# Patient Record
Sex: FEMALE
[De-identification: names, ages, dates, MRNs, and addresses within clinical notes are randomized per-mention and may not be internally consistent; named-entity substitution may affect disease eponyms.]

---

## 2020-11-09 ENCOUNTER — APPOINTMENT (OUTPATIENT)
Dept: RHEUMATOLOGY | Facility: CLINIC | Age: 40
End: 2020-11-09

## 2020-11-23 PROBLEM — Z00.00 ENCOUNTER FOR PREVENTIVE HEALTH EXAMINATION: Status: ACTIVE | Noted: 2020-11-23

## 2020-11-24 ENCOUNTER — APPOINTMENT (OUTPATIENT)
Dept: RHEUMATOLOGY | Facility: CLINIC | Age: 40
End: 2020-11-24
Payer: COMMERCIAL

## 2020-11-24 ENCOUNTER — LABORATORY RESULT (OUTPATIENT)
Age: 40
End: 2020-11-24

## 2020-11-24 VITALS
HEART RATE: 87 BPM | TEMPERATURE: 98.1 F | OXYGEN SATURATION: 100 % | DIASTOLIC BLOOD PRESSURE: 80 MMHG | SYSTOLIC BLOOD PRESSURE: 131 MMHG | HEIGHT: 61 IN

## 2020-11-24 DIAGNOSIS — K90.0 CELIAC DISEASE: ICD-10-CM

## 2020-11-24 DIAGNOSIS — Z82.61 FAMILY HISTORY OF ARTHRITIS: ICD-10-CM

## 2020-11-24 DIAGNOSIS — Z78.9 OTHER SPECIFIED HEALTH STATUS: ICD-10-CM

## 2020-11-24 DIAGNOSIS — Z82.49 FAMILY HISTORY OF ISCHEMIC HEART DISEASE AND OTHER DISEASES OF THE CIRCULATORY SYSTEM: ICD-10-CM

## 2020-11-24 DIAGNOSIS — Z80.42 FAMILY HISTORY OF MALIGNANT NEOPLASM OF PROSTATE: ICD-10-CM

## 2020-11-24 DIAGNOSIS — E03.9 HYPOTHYROIDISM, UNSPECIFIED: ICD-10-CM

## 2020-11-24 DIAGNOSIS — F41.9 ANXIETY DISORDER, UNSPECIFIED: ICD-10-CM

## 2020-11-24 PROCEDURE — 99243 OFF/OP CNSLTJ NEW/EST LOW 30: CPT

## 2020-11-24 RX ORDER — LEVOTHYROXINE SODIUM 50 UG/1
50 TABLET ORAL
Refills: 0 | Status: ACTIVE | COMMUNITY

## 2020-11-24 RX ORDER — SERTRALINE HYDROCHLORIDE 100 MG/1
100 TABLET, FILM COATED ORAL
Refills: 0 | Status: ACTIVE | COMMUNITY

## 2020-11-24 NOTE — HISTORY OF PRESENT ILLNESS
[FreeTextEntry1] : This is 41 y/o woman with hx of hypothyroidism, celiac, and anxiety who presents for evaluation of achy joints.  \par \par Month ago she started feeling achy joints.  It is usually in the AM, her hands and knees are very stiff, sometimes both elbow.s  It has never been that bad, but she did stay home from work for it one time. \par She has taken Aleve and tried heating pads.  \par She hasn't tried any other medications.  \par She has not seen any red or swollen joints.  Movement helps her.  \par She feels it every day.  \par \par She states her celiac is well controlled and she is strict about gluten free diet.

## 2020-11-24 NOTE — ASSESSMENT
[FreeTextEntry1] : This is 39 y/o woman with hx of hypothyroidism, celiac, and anxiety who presents for evaluation of achy joints.  Her history is suggestive of an inflammatory arthritis, such as RA, however I do not detect any synovitis on examination today.  We will investigate further with labs and imaging.  \par \par Plan:\par -Labs as ordered\par -XRs hands, knees\par -Declining any medication for joint pain today\par -f/u 2 weeks 12/8/20 at 3:15pm

## 2020-12-01 LAB
ALBUMIN SERPL ELPH-MCNC: 4.7 G/DL
ALP BLD-CCNC: 61 U/L
ALT SERPL-CCNC: 12 U/L
ANA PAT FLD IF-IMP: ABNORMAL
ANA SER IF-ACNC: ABNORMAL
ANION GAP SERPL CALC-SCNC: 11 MMOL/L
AST SERPL-CCNC: 18 U/L
BASOPHILS # BLD AUTO: 0.04 K/UL
BASOPHILS NFR BLD AUTO: 0.7 %
BILIRUB SERPL-MCNC: 0.2 MG/DL
BUN SERPL-MCNC: 13 MG/DL
C3 SERPL-MCNC: 108 MG/DL
C4 SERPL-MCNC: 18 MG/DL
CALCIUM SERPL-MCNC: 9.6 MG/DL
CCP AB SER IA-ACNC: <8 UNITS
CHLORIDE SERPL-SCNC: 108 MMOL/L
CO2 SERPL-SCNC: 22 MMOL/L
CREAT SERPL-MCNC: 0.87 MG/DL
CRP SERPL-MCNC: <0.1 MG/DL
DSDNA AB SER-ACNC: <12 IU/ML
ENA RNP AB SER IA-ACNC: 0.5 AL
ENA SM AB SER IA-ACNC: <0.2 AL
ENA SS-A AB SER IA-ACNC: <0.2 AL
ENA SS-B AB SER IA-ACNC: <0.2 AL
EOSINOPHIL # BLD AUTO: 0.04 K/UL
EOSINOPHIL NFR BLD AUTO: 0.7 %
ERYTHROCYTE [SEDIMENTATION RATE] IN BLOOD BY WESTERGREN METHOD: 21 MM/HR
GLUCOSE SERPL-MCNC: 102 MG/DL
HCT VFR BLD CALC: 28.6 %
HGB BLD-MCNC: 8.4 G/DL
HLA-B27 RELATED AG QL: NORMAL
IMM GRANULOCYTES NFR BLD AUTO: 0.2 %
LYMPHOCYTES # BLD AUTO: 1.41 K/UL
LYMPHOCYTES NFR BLD AUTO: 23.5 %
MAN DIFF?: NORMAL
MCHC RBC-ENTMCNC: 21.2 PG
MCHC RBC-ENTMCNC: 29.4 GM/DL
MCV RBC AUTO: 72 FL
MONOCYTES # BLD AUTO: 0.41 K/UL
MONOCYTES NFR BLD AUTO: 6.8 %
NEUTROPHILS # BLD AUTO: 4.09 K/UL
NEUTROPHILS NFR BLD AUTO: 68.1 %
PLATELET # BLD AUTO: 320 K/UL
POTASSIUM SERPL-SCNC: 4.3 MMOL/L
PROT SERPL-MCNC: 7.1 G/DL
RBC # BLD: 3.97 M/UL
RBC # FLD: 20.2 %
RF+CCP IGG SER-IMP: NEGATIVE
RHEUMATOID FACT SER QL: <10 IU/ML
SODIUM SERPL-SCNC: 140 MMOL/L
THYROGLOB AB SERPL-ACNC: 2891 IU/ML
THYROPEROXIDASE AB SERPL IA-ACNC: 1354 IU/ML
WBC # FLD AUTO: 6 K/UL

## 2020-12-08 ENCOUNTER — APPOINTMENT (OUTPATIENT)
Dept: RHEUMATOLOGY | Facility: CLINIC | Age: 40
End: 2020-12-08
Payer: COMMERCIAL

## 2020-12-08 ENCOUNTER — TRANSCRIPTION ENCOUNTER (OUTPATIENT)
Age: 40
End: 2020-12-08

## 2020-12-08 VITALS
HEART RATE: 93 BPM | OXYGEN SATURATION: 99 % | TEMPERATURE: 97.3 F | SYSTOLIC BLOOD PRESSURE: 100 MMHG | DIASTOLIC BLOOD PRESSURE: 70 MMHG | HEIGHT: 61 IN

## 2020-12-08 DIAGNOSIS — M25.50 PAIN IN UNSPECIFIED JOINT: ICD-10-CM

## 2020-12-08 DIAGNOSIS — R70.0 ELEVATED ERYTHROCYTE SEDIMENTATION RATE: ICD-10-CM

## 2020-12-08 DIAGNOSIS — R76.8 OTHER SPECIFIED ABNORMAL IMMUNOLOGICAL FINDINGS IN SERUM: ICD-10-CM

## 2020-12-08 PROCEDURE — 99214 OFFICE O/P EST MOD 30 MIN: CPT | Mod: 25

## 2020-12-08 PROCEDURE — 99072 ADDL SUPL MATRL&STAF TM PHE: CPT

## 2020-12-08 PROCEDURE — 36415 COLL VENOUS BLD VENIPUNCTURE: CPT

## 2020-12-08 RX ORDER — ZOLPIDEM TARTRATE 10 MG/1
10 TABLET ORAL
Qty: 30 | Refills: 0 | Status: DISCONTINUED | COMMUNITY
Start: 2020-08-10

## 2020-12-08 NOTE — ASSESSMENT
[FreeTextEntry1] : This is 39 y/o woman with hx of hypothyroidism, celiac, and anxiety who presents for evaluation of achy joints. Her history is suggestive of an inflammatory arthritis. Labs reveal a +BIRD 1:160 speckled, and an elevated ESR at 21.\par \par \par Plan:\par -Labs reviewed, as above\par -XRs hands, knees were unremarkable.  \par - Risks/benefits/adverse effects discussed, including but not limited to GI disturbances, headaches, and retinal toxicity.  Advised baseline eye exam and will perform G6PD screening.\par -Once G6PD obtained, can start /400mg\par f/u 8 weeks\par

## 2020-12-08 NOTE — HISTORY OF PRESENT ILLNESS
[FreeTextEntry1] : This is 39 y/o woman with hx of hypothyroidism, celiac, and anxiety who presents for evaluation of achy joints. \par \par Month ago she started feeling achy joints. It is usually in the AM, her hands and knees are very stiff, sometimes both elbow.s It has never been that bad, but she did stay home from work for it one time. \par She has taken Aleve and tried heating pads. \par She hasn't tried any other medications. \par She has not seen any red or swollen joints. Movement helps her. \par She feels it every day. \par \par She states her celiac is well controlled and she is strict about gluten free diet. \par

## 2020-12-11 LAB — G6PD SER-CCNC: 23.1 U/G HGB

## 2021-02-09 ENCOUNTER — APPOINTMENT (OUTPATIENT)
Dept: RHEUMATOLOGY | Facility: CLINIC | Age: 41
End: 2021-02-09

## 2023-03-16 ENCOUNTER — NON-APPOINTMENT (OUTPATIENT)
Age: 43
End: 2023-03-16

## 2023-06-27 ENCOUNTER — OFFICE (OUTPATIENT)
Dept: URBAN - METROPOLITAN AREA CLINIC 88 | Facility: CLINIC | Age: 43
Setting detail: OPHTHALMOLOGY
End: 2023-06-27
Payer: COMMERCIAL

## 2023-06-27 DIAGNOSIS — H16.223: ICD-10-CM

## 2023-06-27 DIAGNOSIS — H50.00: ICD-10-CM

## 2023-06-27 PROBLEM — H52.4 PRESBYOPIA: Status: ACTIVE | Noted: 2023-06-27

## 2023-06-27 PROCEDURE — 92014 COMPRE OPH EXAM EST PT 1/>: CPT | Performed by: OPTOMETRIST

## 2023-06-27 ASSESSMENT — TONOMETRY
OS_IOP_MMHG: 11
OD_IOP_MMHG: 18

## 2023-06-27 ASSESSMENT — REFRACTION_MANIFEST
OD_SPHERE: +0.25
OD_CYLINDER: -0.75
OD_VA1: 20/25
OS_CYLINDER: -1.00
OD_AXIS: 149
OS_VA1: 20/25+1
OS_AXIS: 046
OS_SPHERE: -0.25

## 2023-06-27 ASSESSMENT — REFRACTION_AUTOREFRACTION
OS_AXIS: 036
OD_AXIS: 146
OD_SPHERE: PLANO
OS_CYLINDER: -1.00
OD_CYLINDER: -0.75
OS_SPHERE: PLANO

## 2023-06-27 ASSESSMENT — VISUAL ACUITY
OD_BCVA: 20/20
OS_BCVA: 20/25-2

## 2023-06-27 ASSESSMENT — KERATOMETRY
OS_K1POWER_DIOPTERS: 40.25
OD_K1POWER_DIOPTERS: 39.75
OD_AXISANGLE_DEGREES: 052
OD_K2POWER_DIOPTERS: 41.00
OS_AXISANGLE_DEGREES: 132
OS_K2POWER_DIOPTERS: 41.75

## 2023-06-27 ASSESSMENT — AXIALLENGTH_DERIVED
OD_AL: 24.8524
OS_AL: 24.8678

## 2023-06-27 ASSESSMENT — CONFRONTATIONAL VISUAL FIELD TEST (CVF)
OS_FINDINGS: FULL
OD_FINDINGS: FULL

## 2023-06-27 ASSESSMENT — SUPERFICIAL PUNCTATE KERATITIS (SPK)
OS_SPK: 1+
OD_SPK: 1+

## 2023-06-27 ASSESSMENT — SPHEQUIV_DERIVED
OD_SPHEQUIV: -0.125
OS_SPHEQUIV: -0.75

## 2024-12-13 ENCOUNTER — APPOINTMENT (OUTPATIENT)
Dept: ORTHOPEDIC SURGERY | Facility: CLINIC | Age: 44
End: 2024-12-13
Payer: COMMERCIAL

## 2024-12-13 VITALS — HEIGHT: 61 IN

## 2024-12-13 VITALS — WEIGHT: 110 LBS | BODY MASS INDEX: 20.77 KG/M2 | HEIGHT: 61 IN

## 2024-12-13 DIAGNOSIS — G95.9 DISEASE OF SPINAL CORD, UNSPECIFIED: ICD-10-CM

## 2024-12-13 DIAGNOSIS — M48.02 SPINAL STENOSIS, CERVICAL REGION: ICD-10-CM

## 2024-12-13 PROCEDURE — 99203 OFFICE O/P NEW LOW 30 MIN: CPT

## 2025-01-28 ENCOUNTER — OUTPATIENT (OUTPATIENT)
Dept: OUTPATIENT SERVICES | Facility: HOSPITAL | Age: 45
LOS: 1 days | End: 2025-01-28
Payer: COMMERCIAL

## 2025-01-28 DIAGNOSIS — R93.9 DIAGNOSTIC IMAGING INCONCLUSIVE DUE TO EXCESS BODY FAT OF PATIENT: ICD-10-CM

## 2025-01-28 PROCEDURE — 74240 X-RAY XM UPR GI TRC 1CNTRST: CPT | Mod: 26

## 2025-01-28 PROCEDURE — 74240 X-RAY XM UPR GI TRC 1CNTRST: CPT

## 2025-01-29 DIAGNOSIS — R93.9 DIAGNOSTIC IMAGING INCONCLUSIVE DUE TO EXCESS BODY FAT OF PATIENT: ICD-10-CM

## 2025-05-07 ENCOUNTER — APPOINTMENT (OUTPATIENT)
Dept: ORTHOPEDIC SURGERY | Facility: CLINIC | Age: 45
End: 2025-05-07

## 2025-05-07 VITALS — WEIGHT: 112 LBS | BODY MASS INDEX: 21.14 KG/M2 | HEIGHT: 61 IN

## 2025-05-07 DIAGNOSIS — M22.2X2 PATELLOFEMORAL DISORDERS, LEFT KNEE: ICD-10-CM

## 2025-05-07 DIAGNOSIS — M25.569 PAIN IN UNSPECIFIED KNEE: ICD-10-CM

## 2025-05-07 DIAGNOSIS — S83.242A OTHER TEAR OF MEDIAL MENISCUS, CURRENT INJURY, LEFT KNEE, INITIAL ENCOUNTER: ICD-10-CM

## 2025-05-07 PROCEDURE — 99203 OFFICE O/P NEW LOW 30 MIN: CPT | Mod: 25

## 2025-05-07 PROCEDURE — L2397: CPT | Mod: LT

## 2025-05-07 PROCEDURE — 73564 X-RAY EXAM KNEE 4 OR MORE: CPT | Mod: LT

## 2025-05-07 PROCEDURE — 99213 OFFICE O/P EST LOW 20 MIN: CPT | Mod: 25

## 2025-05-07 PROCEDURE — L1833: CPT | Mod: LT

## 2025-06-25 ENCOUNTER — APPOINTMENT (OUTPATIENT)
Dept: ORTHOPEDIC SURGERY | Facility: CLINIC | Age: 45
End: 2025-06-25

## 2025-07-15 ENCOUNTER — OFFICE (OUTPATIENT)
Dept: URBAN - METROPOLITAN AREA CLINIC 12 | Facility: CLINIC | Age: 45
Setting detail: OPHTHALMOLOGY
End: 2025-07-15
Payer: COMMERCIAL

## 2025-07-15 DIAGNOSIS — H50.00: ICD-10-CM

## 2025-07-15 DIAGNOSIS — H16.223: ICD-10-CM

## 2025-07-15 DIAGNOSIS — H52.4: ICD-10-CM

## 2025-07-15 PROCEDURE — 92015 DETERMINE REFRACTIVE STATE: CPT | Performed by: OPTOMETRIST

## 2025-07-15 PROCEDURE — 92014 COMPRE OPH EXAM EST PT 1/>: CPT | Performed by: OPTOMETRIST

## 2025-07-15 ASSESSMENT — REFRACTION_MANIFEST
OD_AXIS: 155
OS_VA1: 20/25
OS_CYLINDER: -1.00
OD_SPHERE: -0.50
OS_AXIS: 035
OD_CYLINDER: -1.00
OD_ADD: +1.75
OD_VA1: 20/30
OS_ADD: +1.75
OS_SPHERE: PLANO

## 2025-07-15 ASSESSMENT — CONFRONTATIONAL VISUAL FIELD TEST (CVF)
OD_FINDINGS: FULL
OS_FINDINGS: FULL

## 2025-07-15 ASSESSMENT — KERATOMETRY
OD_AXISANGLE_DEGREES: 037
OD_K2POWER_DIOPTERS: 40.75
OS_AXISANGLE_DEGREES: 132
OD_K1POWER_DIOPTERS: 40.00
OS_K2POWER_DIOPTERS: 41.75
OS_K1POWER_DIOPTERS: 40.75

## 2025-07-15 ASSESSMENT — TONOMETRY
OD_IOP_MMHG: 15
OS_IOP_MMHG: 10

## 2025-07-15 ASSESSMENT — REFRACTION_AUTOREFRACTION
OD_CYLINDER: -1.00
OS_CYLINDER: -1.25
OD_SPHERE: PLANO
OD_AXIS: 152
OS_SPHERE: PLANO
OS_AXIS: 035

## 2025-07-15 ASSESSMENT — VISUAL ACUITY
OD_BCVA: 20/25
OS_BCVA: 20/40+2

## 2025-07-15 ASSESSMENT — SUPERFICIAL PUNCTATE KERATITIS (SPK)
OS_SPK: 1+
OD_SPK: 1+